# Patient Record
(demographics unavailable — no encounter records)

---

## 2025-01-10 NOTE — HISTORY OF PRESENT ILLNESS
[FreeTextEntry1] : interval history: Takes her mounjaro 5 mg but every other week- maintaining her weight  Has had 2 yeast infections in 2024 but admits she has to increase her water intake  Quality: Type 2 DM Severity: uncontrolled Duration: diagnosed at 18 years old Onset: failed oral glucose tolerance tests Modifying Factors: Worse due to diet  Associated Symptoms/ complications: no known microvascular complications. Denies any associated polyuria or polydipsia.   SMBG Checks BS a few times a week Fasting low 100s Post prandial under 200 In office 111  HGA1C 5.8-12/2024  Current drug regimen Metformin  mg 1 tab twice daily Jardiance 25 mg daily- complaining of yeast infections (two in 2024) but admits she can be better with water intake  Monjuaro 5 mg weekly - approx every  14 days (self decision for maintenance)   No longer on insulin   Eye exam: 11/2024- denies DR Foot exam: does not see podiatry- denies numbness.tingling in b.l feet  Kidney disease: denies  Heart disease:denies   Weight: has lost apporx 45-50 lbs total on Mounajro, now stable Diet: doing much better on monjuaro trying to priortize protein Exercise: not exercising, rare yoga- minimum Smoking: denies   Dyslipidemia -Total cholesterol 172, Triglyceride 200, HDL 44,  LDL 93 -On fish oil 1000 mg daily when she remembers   Vit D Def Weekly supplement- 13355 IU Levels wnl on labs

## 2025-01-10 NOTE — ASSESSMENT
[FreeTextEntry1] : T2DM -Pt is too busy at work to try and get pregnant, using protection, can continue GLP1 at this time. Doing excellent  -Continue Mounjaro to 5 mg weekly but resume her every 7 day frequency  -Continue  mg BID, and Jardiance 25 mg daily but must increase water intake. If she gets another yeast infection, we will d/c SGLT2.  -If she is ready to try to concieve pregnancy, need to d.c glp1 and sglt2 and begin meal insulin PRN.  -Restart BS monitoring , at least twice a day. Pt is on portal and should send logs weekly.  -Discussed hypoglycemia and how to correct a low BS  -Pt would beneift from a CDE visit  -Increase exercise as tolerated, goal of 30 mins a day 5x a week.    High triglyceridemia  -Resume fish oil daily   Vit D Def  -Continue weekly rx 50,000 IU    Will continue to monitor vit b- levels wnl    Fasting labs needed in 3 months, rx given  RTO 3 Months.

## 2025-04-11 NOTE — HISTORY OF PRESENT ILLNESS
[FreeTextEntry1] : interval history: Had another yeast infection- stopped her Jardiance Having no appetite on Mounjaro 5 mg weekly and body aches   Quality: Type 2 DM Severity: uncontrolled Duration: diagnosed at 18 years old Onset: failed oral glucose tolerance tests Modifying Factors: Worse due to diet  Associated Symptoms/ complications: no known microvascular complications. Denies any associated polyuria or polydipsia.   SMBG Checks BS a few times a week Fasting low 100s Post prandial under 200 In office 125  HGA1C 5.8---4/2025  Current drug regimen Metformin  mg 2 tab twice daily Monjuaro 5 mg weekly - approx every  14 days (self decision for maintenance)   Discontinued jardiance due to yeast infections No longer on insulin   Eye exam: 11/2024- denies DR Foot exam: does not see podiatry- denies numbness.tingling in b.l feet  Kidney disease: denies  Heart disease:denies   Weight: has lost apporx 45-50 lbs total on Mounajro, now stable Diet: doing much better on monjuaro trying to priortize protein Exercise: not exercising, rare yoga- minimum Smoking: denies   Dyslipidemia -Total cholesterol 160, Triglyceride 109, HDL 48,  LDL 92 -On fish oil 1000 mg daily when she remembers   Vit D Def Weekly supplement- 39031 IU Levels wnl on labs  +anemic, maybe not eating enough protein?  Periods more irregular last 6 months (earlier than 30 days)

## 2025-04-11 NOTE — ASSESSMENT
[FreeTextEntry1] : T2DM -Pt is too busy at work to try and get pregnant, using protection, can continue GLP1 at this time. Doing excellent  -Decrease Mounjaro to 2.5 mg weekly . Goal for her to be able to consume adequate nutrition/increase protein  -Continue  mg 2 tabs BID  -If she is ready to try to concieve pregnancy, need to d.c glp1 and begin meal insulin PRN.  -Restart BS monitoring , at least twice a day. Pt is on portal and should send logs weekly.  -Discussed hypoglycemia and how to correct a low BS  -Pt would beneift from a CDE visit  -Increase exercise as tolerated, goal of 30 mins a day 5x a week.    High triglyceridemia  -Resume fish oil daily   Vit D Def  -Continue weekly rx 50,000 IU    Will continue to monitor vit b- levels wnl  Pt states her periods are newly irregular- rx given for hormonal panel to be done on day 3-5 of period  Fasting labs needed in 3 months, rx given  RTO 3 Months.

## 2025-07-25 NOTE — HISTORY OF PRESENT ILLNESS
[FreeTextEntry1] : interval history: No more yeast infections since stopped sglt2 No more body aches since lowering dose of Mounjaro Able to tolerate food/appropriate protein intake on lower dosing of Mounjaro  Quality: Type 2 DM Severity: uncontrolled Duration: diagnosed at 18 years old Onset: failed oral glucose tolerance tests Modifying Factors: Worse due to diet  Associated Symptoms/ complications: no known microvascular complications. Denies any associated polyuria or polydipsia.   SMBG Checks BS a few times a week Fasting low 100s Post prandial under 200 In office 136  HGA1C 5.8---4/2025  Current drug regimen Metformin  mg 2 tab twice daily Monjuaro 2.5 mg weekly  Discontinued jardiance due to yeast infections No longer on insulin   Eye exam: 11/2024- denies DR Foot exam: does not see podiatry- denies numbness.tingling in b.l feet  Kidney disease: denies  Heart disease:denies   Weight: has lost apporx 45-50 lbs total on Mounajro, now stable Diet: doing much better on monjuaro trying to priortize protein Exercise: not exercising, rare yoga- minimum Smoking: denies   Dyslipidemia -Total cholesterol 181, Triglyceride 387, HDL 36,  LDL 82 -On fish oil 1000 mg daily when she remembers   Vit D Def Weekly supplement- 33029 IU Levels wnl on labs  No longer anemic, periods more regular- maybe because her nutrition is better? Now prioritizing protein

## 2025-07-25 NOTE — ASSESSMENT
[FreeTextEntry1] : T2DM -Pt thinking about pregnancy for end of 2025, at our next visit we will stop Mounjaro  -For now, continue Mounjaro to 2.5 mg weekly . Goal for her to be able to consume adequate nutrition/increase protein  -Continue  mg 2 tabs BID  -Restart BS monitoring , at least twice a day. Pt is on portal and should send logs weekly.  -Discussed hypoglycemia and how to correct a low BS  -Pt would beneift from a CDE visit  -Increase exercise as tolerated, goal of 30 mins a day 5x a week.    High triglyceridemia -Pt think sshe had steak night before labs, need to repeat lipids in one month  -Resume fish oil daily   Vit D Def  -Continue weekly rx 50,000 IU   Will continue to monitor vit b- levels wnl   Fasting labs needed in 3 months, rx given  RTO 3 Months.